# Patient Record
Sex: MALE | Race: OTHER | HISPANIC OR LATINO
[De-identification: names, ages, dates, MRNs, and addresses within clinical notes are randomized per-mention and may not be internally consistent; named-entity substitution may affect disease eponyms.]

---

## 2017-04-26 PROBLEM — Z00.00 ENCOUNTER FOR PREVENTIVE HEALTH EXAMINATION: Status: ACTIVE | Noted: 2017-04-26

## 2017-05-15 ENCOUNTER — APPOINTMENT (OUTPATIENT)
Dept: SURGERY | Facility: HOSPITAL | Age: 22
End: 2017-05-15

## 2017-05-15 ENCOUNTER — OUTPATIENT (OUTPATIENT)
Dept: OUTPATIENT SERVICES | Facility: HOSPITAL | Age: 22
LOS: 1 days | End: 2017-05-15
Payer: SELF-PAY

## 2017-05-15 VITALS
DIASTOLIC BLOOD PRESSURE: 77 MMHG | BODY MASS INDEX: 28.56 KG/M2 | SYSTOLIC BLOOD PRESSURE: 155 MMHG | HEART RATE: 55 BPM | RESPIRATION RATE: 14 BRPM | WEIGHT: 182 LBS | HEIGHT: 67 IN

## 2017-05-15 DIAGNOSIS — K40.90 UNILATERAL INGUINAL HERNIA, W/OUT OBSTRUCTION OR GANGRENE, NOT SPECIFIED AS RECURRENT: ICD-10-CM

## 2017-05-15 DIAGNOSIS — L72.3 SEBACEOUS CYST: ICD-10-CM

## 2017-05-15 PROCEDURE — G0463: CPT

## 2017-05-16 DIAGNOSIS — K40.90 UNILATERAL INGUINAL HERNIA, WITHOUT OBSTRUCTION OR GANGRENE, NOT SPECIFIED AS RECURRENT: ICD-10-CM

## 2017-05-22 PROBLEM — K40.90 RIGHT INGUINAL HERNIA: Status: ACTIVE | Noted: 2017-05-22

## 2017-06-09 ENCOUNTER — OUTPATIENT (OUTPATIENT)
Dept: OUTPATIENT SERVICES | Facility: HOSPITAL | Age: 22
LOS: 1 days | End: 2017-06-09
Payer: SELF-PAY

## 2017-06-09 VITALS
HEART RATE: 57 BPM | WEIGHT: 187.39 LBS | RESPIRATION RATE: 15 BRPM | TEMPERATURE: 98 F | SYSTOLIC BLOOD PRESSURE: 117 MMHG | HEIGHT: 67 IN | DIASTOLIC BLOOD PRESSURE: 70 MMHG | OXYGEN SATURATION: 99 %

## 2017-06-09 DIAGNOSIS — Z01.818 ENCOUNTER FOR OTHER PREPROCEDURAL EXAMINATION: ICD-10-CM

## 2017-06-09 DIAGNOSIS — K40.90 UNILATERAL INGUINAL HERNIA, WITHOUT OBSTRUCTION OR GANGRENE, NOT SPECIFIED AS RECURRENT: ICD-10-CM

## 2017-06-09 LAB
HCT VFR BLD CALC: 45.3 % — SIGNIFICANT CHANGE UP (ref 39–50)
HGB BLD-MCNC: 15.2 G/DL — SIGNIFICANT CHANGE UP (ref 13–17)
MCHC RBC-ENTMCNC: 29.3 PG — SIGNIFICANT CHANGE UP (ref 27–34)
MCHC RBC-ENTMCNC: 33.6 GM/DL — SIGNIFICANT CHANGE UP (ref 32–36)
MCV RBC AUTO: 87.5 FL — SIGNIFICANT CHANGE UP (ref 80–100)
PLATELET # BLD AUTO: 264 K/UL — SIGNIFICANT CHANGE UP (ref 150–400)
RBC # BLD: 5.18 M/UL — SIGNIFICANT CHANGE UP (ref 4.2–5.8)
RBC # FLD: 13 % — SIGNIFICANT CHANGE UP (ref 10.3–14.5)
WBC # BLD: 6.54 K/UL — SIGNIFICANT CHANGE UP (ref 3.8–10.5)
WBC # FLD AUTO: 6.54 K/UL — SIGNIFICANT CHANGE UP (ref 3.8–10.5)

## 2017-06-09 PROCEDURE — 85027 COMPLETE CBC AUTOMATED: CPT

## 2017-06-09 PROCEDURE — G0463: CPT

## 2017-06-09 RX ORDER — ACETAMINOPHEN 500 MG
975 TABLET ORAL ONCE
Qty: 0 | Refills: 0 | Status: COMPLETED | OUTPATIENT
Start: 2017-06-20 | End: 2017-06-20

## 2017-06-09 RX ORDER — CELECOXIB 200 MG/1
200 CAPSULE ORAL ONCE
Qty: 0 | Refills: 0 | Status: COMPLETED | OUTPATIENT
Start: 2017-06-20 | End: 2017-06-20

## 2017-06-09 RX ORDER — LIDOCAINE HCL 20 MG/ML
0.2 VIAL (ML) INJECTION ONCE
Qty: 0 | Refills: 0 | Status: DISCONTINUED | OUTPATIENT
Start: 2017-06-20 | End: 2017-07-05

## 2017-06-09 RX ORDER — SODIUM CHLORIDE 9 MG/ML
3 INJECTION INTRAMUSCULAR; INTRAVENOUS; SUBCUTANEOUS EVERY 8 HOURS
Qty: 0 | Refills: 0 | Status: DISCONTINUED | OUTPATIENT
Start: 2017-06-20 | End: 2017-07-05

## 2017-06-09 NOTE — H&P PST ADULT - SOURCE OF INFORMATION, PROFILE
sister in law, Halima Margarito/patient/family patient/pt knows a little English, primary language is Swedish, requested his sister in law, Halima Pimentel to translate, refused telephone /family

## 2017-06-09 NOTE — H&P PST ADULT - NSANTHOSAYNRD_GEN_A_CORE
No. ANNABELLE screening performed.  STOP BANG Legend: 0-2 = LOW Risk; 3-4 = INTERMEDIATE Risk; 5-8 = HIGH Risk

## 2017-06-09 NOTE — H&P PST ADULT - HISTORY OF PRESENT ILLNESS
23 yo male 23 yo male. denies PMH and PSH, c/o right groin bulge and intermittent right groin pain x 6-8 years. admitted to [facility] ED about 1-2 months ago with severe right groin pain and nausea, diagnosed with incarcerated right inguinal hernia, pt stated the hernia had been reduced, currently denies abd pain, re-evaluted by GI.  presents to PST scheduled for hernia repair surgery.

## 2017-06-20 ENCOUNTER — TRANSCRIPTION ENCOUNTER (OUTPATIENT)
Age: 22
End: 2017-06-20

## 2017-06-20 ENCOUNTER — RESULT REVIEW (OUTPATIENT)
Age: 22
End: 2017-06-20

## 2017-06-20 ENCOUNTER — OUTPATIENT (OUTPATIENT)
Dept: OUTPATIENT SERVICES | Facility: HOSPITAL | Age: 22
LOS: 1 days | End: 2017-06-20
Payer: SELF-PAY

## 2017-06-20 ENCOUNTER — APPOINTMENT (OUTPATIENT)
Dept: TRAUMA SURGERY | Facility: HOSPITAL | Age: 22
End: 2017-06-20

## 2017-06-20 VITALS
DIASTOLIC BLOOD PRESSURE: 65 MMHG | SYSTOLIC BLOOD PRESSURE: 127 MMHG | RESPIRATION RATE: 16 BRPM | HEART RATE: 58 BPM | OXYGEN SATURATION: 98 %

## 2017-06-20 VITALS
TEMPERATURE: 98 F | WEIGHT: 187.39 LBS | HEIGHT: 67 IN | HEART RATE: 54 BPM | SYSTOLIC BLOOD PRESSURE: 136 MMHG | DIASTOLIC BLOOD PRESSURE: 77 MMHG | OXYGEN SATURATION: 99 % | RESPIRATION RATE: 15 BRPM

## 2017-06-20 DIAGNOSIS — K40.90 UNILATERAL INGUINAL HERNIA, WITHOUT OBSTRUCTION OR GANGRENE, NOT SPECIFIED AS RECURRENT: ICD-10-CM

## 2017-06-20 DIAGNOSIS — Z01.818 ENCOUNTER FOR OTHER PREPROCEDURAL EXAMINATION: ICD-10-CM

## 2017-06-20 PROCEDURE — 49505 PRP I/HERN INIT REDUC >5 YR: CPT

## 2017-06-20 PROCEDURE — C1781: CPT

## 2017-06-20 PROCEDURE — 88302 TISSUE EXAM BY PATHOLOGIST: CPT

## 2017-06-20 PROCEDURE — 88302 TISSUE EXAM BY PATHOLOGIST: CPT | Mod: 26

## 2017-06-20 PROCEDURE — 49505 PRP I/HERN INIT REDUC >5 YR: CPT | Mod: RT

## 2017-06-20 RX ORDER — ONDANSETRON 8 MG/1
4 TABLET, FILM COATED ORAL ONCE
Qty: 0 | Refills: 0 | Status: DISCONTINUED | OUTPATIENT
Start: 2017-06-20 | End: 2017-07-05

## 2017-06-20 RX ORDER — CELECOXIB 200 MG/1
200 CAPSULE ORAL ONCE
Qty: 0 | Refills: 0 | Status: COMPLETED | OUTPATIENT
Start: 2017-06-20 | End: 2017-06-20

## 2017-06-20 RX ORDER — OXYCODONE HYDROCHLORIDE 5 MG/1
5 TABLET ORAL ONCE
Qty: 0 | Refills: 0 | Status: DISCONTINUED | OUTPATIENT
Start: 2017-06-20 | End: 2017-06-20

## 2017-06-20 RX ORDER — SODIUM CHLORIDE 9 MG/ML
1000 INJECTION, SOLUTION INTRAVENOUS
Qty: 0 | Refills: 0 | Status: DISCONTINUED | OUTPATIENT
Start: 2017-06-20 | End: 2017-07-05

## 2017-06-20 RX ORDER — CEFAZOLIN SODIUM 1 G
2000 VIAL (EA) INJECTION ONCE
Qty: 0 | Refills: 0 | Status: COMPLETED | OUTPATIENT
Start: 2017-06-20 | End: 2017-06-20

## 2017-06-20 RX ORDER — OXYCODONE HYDROCHLORIDE 5 MG/1
10 TABLET ORAL ONCE
Qty: 0 | Refills: 0 | Status: DISCONTINUED | OUTPATIENT
Start: 2017-06-20 | End: 2017-06-20

## 2017-06-20 RX ADMIN — Medication 975 MILLIGRAM(S): at 11:24

## 2017-06-20 RX ADMIN — CELECOXIB 200 MILLIGRAM(S): 200 CAPSULE ORAL at 11:25

## 2017-06-20 RX ADMIN — CELECOXIB 200 MILLIGRAM(S): 200 CAPSULE ORAL at 15:26

## 2017-06-20 NOTE — ASU DISCHARGE PLAN (ADULT/PEDIATRIC). - MEDICATION SUMMARY - MEDICATIONS TO TAKE
I will START or STAY ON the medications listed below when I get home from the hospital:    Percocet 5/325 oral tablet  -- 1 tab(s) by mouth every 6 hours, As Needed -for severe pain MDD:4 tabs per day  -- Indication: For pain

## 2017-06-20 NOTE — ASU DISCHARGE PLAN (ADULT/PEDIATRIC). - SPECIAL INSTRUCTIONS
Ok to shower and rinse wound with warm soapy water.  Do not scrub wound, pat dry. Please call the doctor immediately if you develop fever, chills, inability to tolerate liquid or food, diarrhea, nausea, vomiting or increased abdominal pain. Follow a regular diet. Do not drive or operate machinery while taking narcotic pain medication. Please follow up with your surgeon and primary care doctor within 1 week of discharge.

## 2017-07-27 ENCOUNTER — APPOINTMENT (OUTPATIENT)
Dept: NEUROLOGY | Facility: HOSPITAL | Age: 22
End: 2017-07-27

## 2017-08-02 ENCOUNTER — APPOINTMENT (OUTPATIENT)
Dept: TRAUMA SURGERY | Facility: CLINIC | Age: 22
End: 2017-08-02

## 2017-09-01 ENCOUNTER — APPOINTMENT (OUTPATIENT)
Dept: TRAUMA SURGERY | Facility: CLINIC | Age: 22
End: 2017-09-01

## 2021-08-02 ENCOUNTER — EMERGENCY (EMERGENCY)
Facility: HOSPITAL | Age: 26
LOS: 0 days | Discharge: HOME | End: 2021-08-02
Attending: EMERGENCY MEDICINE
Payer: MEDICAID

## 2021-08-02 VITALS
RESPIRATION RATE: 18 BRPM | WEIGHT: 179.9 LBS | OXYGEN SATURATION: 99 % | TEMPERATURE: 99 F | DIASTOLIC BLOOD PRESSURE: 93 MMHG | HEART RATE: 72 BPM | HEIGHT: 65 IN | SYSTOLIC BLOOD PRESSURE: 138 MMHG

## 2021-08-02 DIAGNOSIS — H92.02 OTALGIA, LEFT EAR: ICD-10-CM

## 2021-08-02 DIAGNOSIS — H66.92 OTITIS MEDIA, UNSPECIFIED, LEFT EAR: ICD-10-CM

## 2021-08-02 DIAGNOSIS — Z23 ENCOUNTER FOR IMMUNIZATION: ICD-10-CM

## 2021-08-02 PROBLEM — K40.90 UNILATERAL INGUINAL HERNIA, WITHOUT OBSTRUCTION OR GANGRENE, NOT SPECIFIED AS RECURRENT: Chronic | Status: ACTIVE | Noted: 2017-06-09

## 2021-08-02 PROCEDURE — 99283 EMERGENCY DEPT VISIT LOW MDM: CPT | Mod: 25

## 2021-08-02 PROCEDURE — 99283 EMERGENCY DEPT VISIT LOW MDM: CPT

## 2021-08-02 RX ORDER — CX-024414 0.2 MG/ML
0.5 INJECTION, SUSPENSION INTRAMUSCULAR ONCE
Refills: 0 | Status: COMPLETED | OUTPATIENT
Start: 2021-08-02 | End: 2021-08-02

## 2021-08-02 RX ADMIN — Medication 1 TABLET(S): at 14:33

## 2021-08-02 RX ADMIN — CX-024414 0.5 MILLILITER(S): 0.2 INJECTION, SUSPENSION INTRAMUSCULAR at 14:33

## 2021-08-02 NOTE — ED STATDOCS - CARE PLAN
Principal Discharge DX:	Acute otitis media, unspecified otitis media type  Secondary Diagnosis:	Ear pain, left

## 2021-08-02 NOTE — ED STATDOCS - NSFOLLOWUPINSTRUCTIONS_ED_ALL_ED_FT
Otitis media en los adultos    Otitis Media, Adult       La otitis media es la inflamación y la acumulación de líquido en el oído medio, que se manifiesta con signos y síntomas de annmarie infección aguda. El oído medio es la parte del oído que contiene los huesos de la audición, así jonny el aire que ayuda a enviar los sonidos al cerebro. Cuando se acumula líquido infectado en edgar espacio, causa presión y produce los síntomas de la otitis media aguda. La trompa de Cj conecta el oído medio con la parte posterior de la nariz (nasofaringe) y, normalmente, permite que ingrese aire en el espacio del oído medio. Si la trompa de Cj se obstruye, puede acumularse líquido e infectarse.      ¿Cuáles son las causas?  Esta afección es consecuencia de annmarie obstrucción en la trompa de Cj. La causa puede ser algo similar a annmarie mucosidad o hinchazón (edema) de la trompa. Algunos de los problemas que pueden causar annmarie obstrucción son los siguientes:  •Resfriados u otra infección de las vías respiratorias superiores.      •Alergias.      •Un irritante, jonny el humo del tabaco.      •Adenoides agrandadas. Las adenoides son zonas de tejido blando ubicadas en la parte posterior de la garganta, detrás de la nariz y en el paladar. Cyndi parte del sistema de defensa del organismo (sistema inmunitario).      •Un bulto en la nasofaringe.      •Daño en el oído a causa de cambios de presión (barotraumatismo).        ¿Cuáles son los signos o síntomas?  Los síntomas de esta afección incluyen:  •Dolor de oído.      •Fiebre.      •Disminución de la audición.      •Cansancio (letargo).      •Supuración de líquido por el oído, si el tímpano se rompe o revienta.      •Zumbidos en el oído.        ¿Cómo se diagnostica?     Esta afección se diagnostica mediante un examen físico. Tono el examen, con un instrumento llamado otoscopio, el médico mirará dentro del oído para detectar enrojecimiento, hinchazón y presencia de líquido. También le preguntará acerca de vlad síntomas.  El médico también puede indicarle estudios, jonny:  •Annmarie otoscopia neumática. Es un estudio que se realiza para verificar el movimiento del tímpano. Se realiza introduciendo annmarie pequeña cantidad de aire en el oído.      •Un timpanograma es un estudio que muestra si el tímpano se mueve en respuesta a la presión de aire en el canal auditivo. Genera un gráfico para que el médico lo evalúe.        ¿Cómo se trata?  Esta afección puede desaparecer sin tratamiento en el transcurso de 3 a 5 días. Sin embargo, si la causa de la afección es annmarie infección bacteriana y no desaparece sin tratamiento, o si vuelve a aparecer más de annmarie vez, el médico podría hacer lo siguiente:  •Recetarle antibióticos para tratar la infección.      •Recetarle o recomendarle medicamentos para controlar el dolor.        Siga estas instrucciones en sweet casa:    •Use los medicamentos de venta james y los recetados solamente jonny se lo haya indicado el médico.      •Si le recetaron un antibiótico, tómelo jonny se lo haya indicado el médico. No deje de mac los antibióticos, aunque comience a sentirse mejor.      •Concurra a todas las visitas de seguimiento jonny se lo haya indicado el médico. Holliday es importante.        Comuníquese con un médico si:    •Le sangra la nariz.      •Tiene un bulto en el nitin.      •No se siente mejor al cabo de 5 días.      •Empeora en lugar de mejorar.        Solicite ayuda de inmediato si:    •Tiene dolor intenso que no puede controlar con medicamentos.      •Tiene hinchazón, enrojecimiento o dolor en el oído.      •Siente rigidez en el nitin.      •Annmarie parte de sweet martha no se mueve (paralizada).      •Le duele el hueso que se encuentra detrás del oído (mastoides) al tocarlo.      •Desarrolla un dolor de aryan intenso.        Resumen    •La otitis media es el enrojecimiento, el dolor y la hinchazón del oído medio, que generalmente provoca dolor.      •Esta afección puede desaparecer sin tratamiento en el transcurso de 3 a 5 días.      •Si el problema no desaparece en el término de 3 a 5 días, el médico puede recetarle o recomendarle medicamentos para tratar la infección o los síntomas.      •Si le recetaron un antibiótico, tómelo jonny se lo haya indicado el médico.      •Siga todas las instrucciones que le haya dado sweet médico.

## 2021-08-02 NOTE — ED STATDOCS - PATIENT PORTAL LINK FT
You can access the FollowMyHealth Patient Portal offered by [facility] by registering at the following website: [email or web address]. By joining [facility]’s FollowMyHealth portal, you will also be able to view your health information using other applications (apps) compatible with our system.

## 2021-08-02 NOTE — ED STATDOCS - ENMT, MLM
Nasal mucosa clear.  L TM redness, TM retract, appears intact, ear canal appears normal, +anterior lymph nodes that are tender. Mouth with normal mucosa  Throat has no vesicles, no oropharyngeal exudates and uvula is midline.

## 2021-08-02 NOTE — ED STATDOCS - PROGRESS NOTE DETAILS
25 yo male presents with 2 days of L ear pain. L Tm erythematous and has pain with pulling on the L pinna. Pt also requesting to receive the covid vaccine. -Jimi Gooden PA-C

## 2021-08-02 NOTE — ED STATDOCS - OBJECTIVE STATEMENT
27 y/o male with a PMHx of inguinal hernia, presents to the ED c/o L ear pain x 3 days. Denies trauma or recent swimming. Current smoker. NKDA.

## 2023-09-08 NOTE — PRE-ANESTHESIA EVALUATION ADULT - BSA (M2)
YUE from [facility] for respiratory distress, ems states pt was satting 80% on room air and was vomiting, presently on 100% NRB Mask and satting 95%
1.97
1.97
